# Patient Record
Sex: FEMALE | Race: WHITE | NOT HISPANIC OR LATINO | Employment: FULL TIME | URBAN - METROPOLITAN AREA
[De-identification: names, ages, dates, MRNs, and addresses within clinical notes are randomized per-mention and may not be internally consistent; named-entity substitution may affect disease eponyms.]

---

## 2017-02-27 ENCOUNTER — HISTORICAL (OUTPATIENT)
Dept: ADMINISTRATIVE | Facility: HOSPITAL | Age: 47
End: 2017-02-27

## 2017-12-18 ENCOUNTER — HISTORICAL (OUTPATIENT)
Dept: RADIOLOGY | Facility: HOSPITAL | Age: 47
End: 2017-12-18

## 2019-01-14 ENCOUNTER — HISTORICAL (OUTPATIENT)
Dept: RADIOLOGY | Facility: HOSPITAL | Age: 49
End: 2019-01-14

## 2019-06-03 ENCOUNTER — HISTORICAL (OUTPATIENT)
Dept: ADMINISTRATIVE | Facility: HOSPITAL | Age: 49
End: 2019-06-03

## 2019-06-03 LAB
ALBUMIN SERPL-MCNC: 4.3 GM/DL (ref 3.4–5)
ALBUMIN/GLOB SERPL: 1.65 {RATIO} (ref 1.5–2.5)
ALP SERPL-CCNC: 44 UNIT/L (ref 38–126)
ALT SERPL-CCNC: 16 UNIT/L (ref 7–52)
AST SERPL-CCNC: 22 UNIT/L (ref 15–37)
BILIRUB SERPL-MCNC: 0.4 MG/DL (ref 0.2–1)
BILIRUBIN DIRECT+TOT PNL SERPL-MCNC: 0.1 MG/DL (ref 0–0.5)
BILIRUBIN DIRECT+TOT PNL SERPL-MCNC: 0.3 MG/DL
BUN SERPL-MCNC: 11 MG/DL (ref 7–18)
CALCIUM SERPL-MCNC: 9 MG/DL (ref 8.5–10)
CHLORIDE SERPL-SCNC: 103 MMOL/L (ref 98–107)
CO2 SERPL-SCNC: 28 MMOL/L (ref 21–32)
CREAT SERPL-MCNC: 0.78 MG/DL (ref 0.6–1.3)
GLOBULIN SER-MCNC: 2.6 GM/DL (ref 1.2–3)
GLUCOSE SERPL-MCNC: 106 MG/DL (ref 74–106)
POTASSIUM SERPL-SCNC: 3.7 MMOL/L (ref 3.5–5.1)
PROT SERPL-MCNC: 6.9 GM/DL (ref 6.4–8.2)
SODIUM SERPL-SCNC: 137 MMOL/L (ref 136–145)

## 2019-06-04 ENCOUNTER — HISTORICAL (OUTPATIENT)
Dept: LAB | Facility: HOSPITAL | Age: 49
End: 2019-06-04

## 2020-01-17 ENCOUNTER — HISTORICAL (OUTPATIENT)
Dept: RADIOLOGY | Facility: HOSPITAL | Age: 50
End: 2020-01-17

## 2020-11-30 ENCOUNTER — HISTORICAL (OUTPATIENT)
Dept: ADMINISTRATIVE | Facility: HOSPITAL | Age: 50
End: 2020-11-30

## 2020-11-30 LAB
ABS NEUT (OLG): 2.5 X10(3)/MCL (ref 2.1–9.2)
ERYTHROCYTE [DISTWIDTH] IN BLOOD BY AUTOMATED COUNT: 11.8 % (ref 11.5–17)
HCT VFR BLD AUTO: 37.2 % (ref 37–47)
HGB BLD-MCNC: 12.3 GM/DL (ref 12–16)
LYMPHOCYTES # BLD AUTO: 2.1 X10(3)/MCL (ref 0.6–3.4)
LYMPHOCYTES NFR BLD AUTO: 41.8 % (ref 13–40)
MCH RBC QN AUTO: 29.7 PG (ref 27–31.2)
MCHC RBC AUTO-ENTMCNC: 33 GM/DL (ref 32–36)
MCV RBC AUTO: 90 FL (ref 80–94)
MONOCYTES # BLD AUTO: 0.4 X10(3)/MCL (ref 0.1–1.3)
MONOCYTES NFR BLD AUTO: 7.7 % (ref 0.1–24)
NEUTROPHILS NFR BLD AUTO: 50.5 % (ref 47–80)
PLATELET # BLD AUTO: 264 X10(3)/MCL (ref 130–400)
PMV BLD AUTO: 8.8 FL (ref 9.4–12.4)
RBC # BLD AUTO: 4.14 X10(6)/MCL (ref 4.2–5.4)
WBC # SPEC AUTO: 5 X10(3)/MCL (ref 4.5–11.5)

## 2021-01-18 ENCOUNTER — HISTORICAL (OUTPATIENT)
Dept: RADIOLOGY | Facility: HOSPITAL | Age: 51
End: 2021-01-18

## 2021-06-04 ENCOUNTER — HISTORICAL (OUTPATIENT)
Dept: ADMINISTRATIVE | Facility: HOSPITAL | Age: 51
End: 2021-06-04

## 2021-06-04 LAB
ABS NEUT (OLG): 3.9 X10(3)/MCL (ref 2.1–9.2)
ALBUMIN SERPL-MCNC: 4.6 GM/DL (ref 3.4–5)
ALBUMIN/GLOB SERPL: 1.64 {RATIO} (ref 1.5–2.5)
ALP SERPL-CCNC: 58 UNIT/L (ref 38–126)
ALT SERPL-CCNC: 16 UNIT/L (ref 7–52)
APPEARANCE, UA: CLEAR
AST SERPL-CCNC: 22 UNIT/L (ref 15–37)
BACTERIA #/AREA URNS AUTO: ABNORMAL /HPF
BILIRUB SERPL-MCNC: 0.4 MG/DL (ref 0.2–1)
BILIRUB UR QL STRIP: NEGATIVE MG/DL
BILIRUBIN DIRECT+TOT PNL SERPL-MCNC: 0.1 MG/DL (ref 0–0.5)
BILIRUBIN DIRECT+TOT PNL SERPL-MCNC: 0.3 MG/DL
BUN SERPL-MCNC: 10 MG/DL (ref 7–18)
CALCIUM SERPL-MCNC: 9.7 MG/DL (ref 8.5–10)
CHLORIDE SERPL-SCNC: 100 MMOL/L (ref 98–107)
CHOLEST SERPL-MCNC: 228 MG/DL (ref 0–200)
CHOLEST/HDLC SERPL: 2.9 {RATIO}
CO2 SERPL-SCNC: 31 MMOL/L (ref 21–32)
COLOR UR: YELLOW
CREAT SERPL-MCNC: 0.73 MG/DL (ref 0.6–1.3)
ERYTHROCYTE [DISTWIDTH] IN BLOOD BY AUTOMATED COUNT: 12 % (ref 11.5–17)
EST CREAT CLEARANCE SER (OHS): 78.01 ML/MIN
GLOBULIN SER-MCNC: 2.8 GM/DL (ref 1.2–3)
GLUCOSE (UA): NEGATIVE MG/DL
GLUCOSE SERPL-MCNC: 93 MG/DL (ref 74–106)
HCT VFR BLD AUTO: 37.6 % (ref 37–47)
HDLC SERPL-MCNC: 79 MG/DL (ref 35–60)
HGB BLD-MCNC: 12.7 GM/DL (ref 12–16)
HGB UR QL STRIP: NEGATIVE UNIT/L
KETONES UR QL STRIP: NEGATIVE MG/DL
LDLC SERPL CALC-MCNC: 118 MG/DL (ref 0–129)
LEUKOCYTE ESTERASE UR QL STRIP: ABNORMAL UNIT/L
LYMPHOCYTES # BLD AUTO: 1.8 X10(3)/MCL (ref 0.6–3.4)
LYMPHOCYTES NFR BLD AUTO: 27.7 % (ref 13–40)
MCH RBC QN AUTO: 30.6 PG (ref 27–31.2)
MCHC RBC AUTO-ENTMCNC: 34 GM/DL (ref 32–36)
MCV RBC AUTO: 91 FL (ref 80–94)
MONOCYTES # BLD AUTO: 0.7 X10(3)/MCL (ref 0.1–1.3)
MONOCYTES NFR BLD AUTO: 11.7 % (ref 0.1–24)
NEUTROPHILS NFR BLD AUTO: 60.6 % (ref 47–80)
NITRITE UR QL STRIP.AUTO: NEGATIVE
PH UR STRIP: 7 [PH]
PLATELET # BLD AUTO: 260 X10(3)/MCL (ref 130–400)
PMV BLD AUTO: 8.6 FL (ref 9.4–12.4)
POTASSIUM SERPL-SCNC: 4.3 MMOL/L (ref 3.5–5.1)
PROT SERPL-MCNC: 7.4 GM/DL (ref 6.4–8.2)
PROT UR QL STRIP: NEGATIVE MG/DL
RBC # BLD AUTO: 4.15 X10(6)/MCL (ref 4.2–5.4)
RBC #/AREA URNS HPF: ABNORMAL /HPF
SODIUM SERPL-SCNC: 137 MMOL/L (ref 136–145)
SP GR UR STRIP: 1.01
SQUAMOUS EPITHELIAL, UA: ABNORMAL /LPF
TRIGL SERPL-MCNC: 139 MG/DL (ref 30–150)
TSH SERPL-ACNC: 0.99 MIU/ML (ref 0.35–4.94)
UROBILINOGEN UR STRIP-ACNC: 0.2 MG/DL
VLDLC SERPL CALC-MCNC: 27.8 MG/DL
WBC # SPEC AUTO: 6.4 X10(3)/MCL (ref 4.5–11.5)
WBC #/AREA URNS AUTO: ABNORMAL /[HPF]

## 2022-01-21 ENCOUNTER — HISTORICAL (OUTPATIENT)
Dept: RADIOLOGY | Facility: HOSPITAL | Age: 52
End: 2022-01-21

## 2022-04-07 ENCOUNTER — HISTORICAL (OUTPATIENT)
Dept: ADMINISTRATIVE | Facility: HOSPITAL | Age: 52
End: 2022-04-07

## 2022-04-24 VITALS
SYSTOLIC BLOOD PRESSURE: 110 MMHG | BODY MASS INDEX: 18.69 KG/M2 | HEIGHT: 67 IN | WEIGHT: 119.06 LBS | DIASTOLIC BLOOD PRESSURE: 80 MMHG

## 2022-05-01 NOTE — HISTORICAL [facility]
This is a historical note converted from Mora. Formatting and pictures may have been removed.  Please reference Mora for original formatting and attached multimedia. Chief Complaint  6 month recheck  History of Present Illness  Patient here for six-month follow-up. Denies any side effects to current medications.? Tolerating current meds.? Denies change in social or family history.  ?  Anxiety/Depression: She has completely weaned herself off of clonazepam.? Since then, she finds?her anxiety is increased?in the afternoons.? She?is having mild panic attacks almost daily. ?She denies any suicidal/harmful ideations.??She is currently taking BuSpar 15 mg 1-1/2?tabs?twice a day.  ?  Anemia: She takes an iron supplement daily. No fatigue.  ?  Vaccinations:  Flu -?received 8/2020  Review of Systems  General:?? Patient reports energy level is ?good. Denies weight change.??Denies fever,chills, night sweats, or weakness. ?Denies fatigue.  HEENT:?? Denies vision changes or eye pain.??No sore throat, ear pain, sinus pressure or discharge.  Cardiovascular:?? Denies chest pain, palpitations, dyspnea on exertion, orthopnea.  Respiratory:?? No cough, wheezing, shortness of breath, or sputum.  GI:?? Denies nausea, emesis, constipation, diarrhea, melena, hematochezia or abdominal pain  :?? No frequency, urgency, hematuria, discharge, or incontinence  MS:?? Denies myalgias, arthralgias, joint effusion, edema, or weakness  Neuro:?? No headaches, numbness in extremities, tingling, dizziness, or weakness  Psych:?? See HPI  Endo:?? Denies polyuria, polydipsia, polyphagia  Heme:?? No abnormal bleeding or bruising. No lymph node enlargement or pain.  ?  Physical Exam  Vitals & Measurements  HR:?76(Peripheral)? BP:?122/70?  HT:?169?cm? HT:?169.00?cm? WT:?54?kg? WT:?54.000?kg? BMI:?18.91?  General:?? Well-developed and??nourished, no apparent distress, alert and oriented??4.  HEENT:?? PERRLA, EOMI  Neck:?? Supple, no lymphadenopathy, no  thyromegaly, no bruits, no jugular venous distention.  Cardiovascular:?? Regular rhythm and rate, no murmurs, radial and dorsal pedal pulses 2+ bilaterally.  Respiratory:?? Lungs clear to auscultation bilaterally, no wheezes, no crackles, no rhonchi.??Good air movement.  Abdomen:?? NABS, soft, nontender, no hepatosplenomegaly, no masses, no guarding or rebound.?  Neuro:?? CN II-XII intact_  Psych: Normal affect, patient calm, good historian, patient answers questions?appropriately. Good hygiene.  Heme:? No bruising or petechiae.  ?  Assessment/Plan  1.?Anxiety?F41.9  Increase Buspar to 15mg 1.5 tabs TID PRN anxiety. ?Patient is aware?this is the max dosage of Buspar.  She wishes to?continue to avoid?clonazepam.  Continue sertraline 100 mg twice daily and Wellbutrin XL?150?mg daily.  Ordered:  sertraline, 100 mg = 1 tab(s), Oral, BID, # 180 tab(s), 1 Refill(s), Pharmacy: [facility] #[id number], 169, cm, Height/Length Dosing, 11/30/20 9:20:00 CST, 54, kg, Weight Dosing, 11/30/20 9:20:00 CST  Clinic Follow up, *Est. 05/30/21 3:00:00 CDT, CPX in 6 months, CPX in 6 months, Order for future visit, Anxiety  Depression  Anemia, [facility]  Office/Outpatient Visit Level 3 Established 38849 , Anxiety  Depression  Anemia, [facility] - [facility], 11/30/20 9:50:00 CST  ?  2.?Depression?F32.9  Controlled.  Ordered:  sertraline, 100 mg = 1 tab(s), Oral, BID, # 180 tab(s), 1 Refill(s), Pharmacy: [facility]/pharmacy #[id number], 169, cm, Height/Length Dosing, 11/30/20 9:20:00 CST, 54, kg, Weight Dosing, 11/30/20 9:20:00 CST  Clinic Follow up, *Est. 05/30/21 3:00:00 CDT, CPX in 6 months, CPX in 6 months, Order for future visit, Anxiety  Depression  Anemia, [facility]  Office/Outpatient Visit Level 3 Established 40445 PC, Anxiety  Depression  Anemia, [facility] - [facility], 11/30/20 9:50:00 CST  ?  3.?Anemia?D64.9  ?Lab today: CBC  Ordered:  CBC w/ Auto Diff, Routine collect, 11/30/20 9:49:00 CST, Blood, Order for future visit, Stop date 11/30/20 9:49:00  CST, Lab Collect, Anemia, 11/30/20 9:49:00 CST  Clinic Follow up, *Est. 05/30/21 3:00:00 CDT, CPX in 6 months, CPX in 6 months, Order for future visit, Anxiety  Depression  Anemia, [facility]  Lab Collection Request, 11/30/20 9:49:00 CST, [facility] - [facility], 11/30/20 9:49:00 CST, Anemia  Office/Outpatient Visit Level 3 Established 76868 , Anxiety  Depression  Anemia, [facility] - [facility], 11/30/20 9:50:00 CST  ?  Orders:  buPROPion, 150 mg = 1 tab(s), Oral, Daily, # 90 tab(s), 1 Refill(s), Pharmacy: [facility]/pharmacy #[id number], 169, cm, Height/Length Dosing, 11/30/20 9:20:00 CST, 54, kg, Weight Dosing, 11/30/20 9:20:00 CST  busPIRone, 22.5 mg = 1.5 tab(s), Oral, TID, PRN PRN anxiety, # 405 tab(s), 1 Refill(s), Pharmacy: [facility]/pharmacy #[id number], 169, cm, Height/Length Dosing, 11/30/20 9:20:00 CST, 54, kg, Weight Dosing, 11/30/20 9:20:00 CST  Referrals  Clinic Follow up, *Est. 05/30/21 3:00:00 CDT, CPX in 6 months, CPX in 6 months, Order for future visit, Anxiety  Depression  Anemia, [facility]   Problem List/Past Medical History  Ongoing  Anemia  Anxiety  Depression  Macrocytosis  Migraine  Wellness examination  Historical  cerebral aneurym  Stroke  Procedure/Surgical History  Colonoscopy (06/20/2019)  Laparoscopy, surgical; with fulguration of oviducts (with or without transection). (06/07/2013)  Other bilateral endoscopic destruction or occlusion of fallopian tubes (06/07/2013)  cerebral shunt  repair of cerebral aneurysm   Medications  buPROPion 150 mg/24 hours (XL) oral tablet, extended release, 150 mg= 1 tab(s), Oral, Daily, 1 refills  busPIRone 15 mg oral tablet, 22.5 mg= 1.5 tab(s), Oral, TID, PRN, 1 refills  sertraline 100 mg oral tablet, 100 mg= 1 tab(s), Oral, BID, 1 refills  Allergies  Demerol?(tremors)  Dilantin?(rash)  Social History  Abuse/Neglect  No, 11/30/2020  Alcohol - Denies Alcohol Use, 06/06/2013  Past, 02/11/2019  Home/Environment  Lives with Spouse., 06/03/2019  Substance Use - Denies Substance Abuse,  06/06/2013  Never, 02/11/2019  Tobacco - Denies Tobacco Use, 06/06/2013  Never (less than 100 in lifetime), No, 11/30/2020  Family History  Alcoholism.: Father.  Depression.: Father.  Immunizations  Vaccine Date Status   influenza virus vaccine, inactivated 08/24/2020 Given   zoster vaccine, inactivated 08/24/2020 Given   tetanus/diphtheria/pertussis, acel(Tdap) 07/10/2020 Given   zoster vaccine, inactivated 05/27/2020 Given   tetanus/diphtheria/pertussis, acel(Tdap) 05/27/2020 Given   influenza virus vaccine, inactivated 10/05/2018 Recorded   Health Maintenance  Health Maintenance  ???Pending?(in the next year)  ??? ??OverDue  ??? ? ? ?Alcohol Misuse Screening due??01/02/20??and every 1??year(s)  ??? ??Due?  ??? ? ? ?Influenza Vaccine due??10/01/20??and every 1??day(s)  ??? ? ? ?ADL Screening due??11/30/20??and every 1??year(s)  ??? ??Due In Future?  ??? ? ? ?Obesity Screening not due until??01/01/21??and every 1??year(s)  ???Satisfied?(in the past 1 year)  ??? ??Satisfied?  ??? ? ? ?Blood Pressure Screening on??11/30/20.??Satisfied by Ginny Salazar LPN  ??? ? ? ?Body Mass Index Check on??11/30/20.??Satisfied by Ginny Salazar LPN  ??? ? ? ?Breast Cancer Screening on??01/17/20.??Satisfied by Darion Lund  ??? ? ? ?Cervical Cancer Screening on??10/15/20.??Satisfied by Kaitlin Martines  ??? ? ? ?Diabetes Screening on??05/27/20.??Satisfied by Mike Odom  ??? ? ? ?Influenza Vaccine on??08/24/20.??Satisfied by Rachel Ma CMA  ??? ? ? ?Lipid Screening on??05/27/20.??Satisfied by Mike Odom  ??? ? ? ?Obesity Screening on??11/30/20.??Satisfied by Ginny Salazar LPN  ??? ? ? ?Tetanus Vaccine on??07/10/20.??Satisfied by Kat Walls  ?      Patient condition discussed?in detail with nurse practitioner.??Agree with plan of care?and follow-up.  ?

## 2022-05-01 NOTE — HISTORICAL [facility] C[facility]ER
This is a historical note converted from Mora. Formatting and pictures may have been removed.  Please reference Mora for original formatting and attached multimedia. Chief Complaint  cpx  History of Present Illness  Patient presents for her wellness exam.  Reports that her medications are working well without side effects?and her mood is?well controlled.??Denies change in?social or family history.  ?   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 ETOH: none, ?Tob: none,? Exercise: walks 5 miles a day,  Going on a month long road trip this summer.  ?   Father  at?54: Pancreatic CA, alcoholism  Mother 72:?multiple myeloma in remission, diagnosed at 71yo  Brother 49: healthy except injuries from MVA  PGM Pancreatic CA, PAunt with Lung CA  ?   Surgical Hx:  Surgery to repair Cerebral Aneurysm at 15 yrs old. Occurred on May 16, stayed 16 days in hospital. Cerebral shunt placed.  Tubal ligation   ?   GI: Person,? Normal colonoscopy 18  GYN: Dr. Ogden, menopausal  ?????Last MMG: UTD  ?????Last DEXA: no lifetime  ?????Last PAP: UTD  Review of Systems  General:??????????????? Patient reports energy level is good.???Denies fever,chills, night sweats, fatigue or weakness.  Integument:???????? Denies any nevus changes, rashes, urticaria, ?or sores.? Also denies itching or areas of numbness.  HEENT:?????????????????? Denies vision changes or eye pain.? No sore throat, ear pain, sinus pressure or discharge.  Cardiovascular:??? Denies chest pain, palpitations, dyspnea on exertion, orthopnea.  Respiratory:???????? No cough, wheezing, shortness of breath, or sputum.  GI:????????????????????????? Denies nausea, emesis, constipation, diarrhea, melena, hematochezia or abdominal pain  :??????????????????????? No frequency, urgency, hematuria, discharge, or incontinence  MS:?????????????????????? Denies myalgias, arthralgias, joint effusion, edema, or  weakness  Neuro:????????????????? No headaches, numbness in extremities, tingling, dizziness, or weakness  Psych:?????????????????? Denies anxiety, depression, suicidal or homicidal ideations, or irritability  Endo:??????????????????? Denies polyuria, polydipsia, polyphagia  Heme:????????????????? No abnormal bleeding or bruising. No lymph node enlargement or pain.  Physical Exam  Vitals & Measurements  HR:?73(Peripheral)? BP:?115/70?  HT:?169.00?cm? HT:?169?cm? WT:?54.200?kg? WT:?54.2?kg? BMI:?18.98?  General :?????Well-developed and? nourished, no apparent distress, alert and oriented ?4  Integument :????? Skin is intact with no erythema.? No pustules or vesicles.? No rash or scale. No Lymphadenopathy.? No urticaria.? No abnormal nevi  HEENT :?????YOEL, EOMI ; TMs and EACs clear, normal turbinates with no erythema, normal mucosa, no sinus tenderness; no erythema or exudate of mouth or pharynx  Neck :?????Supple, no lymphadenopathy, no thyromegaly, no bruits, no jugular venous distention  Cardiovascular :?????? Regular rhythm and rate, no murmurs, radial and dorsal pedal pulses 2+ bilaterally  Respiratory :??????Lungs clear to auscultation bilaterally, no wheezes, no crackles, no rhonchi.? Good air movement  Abdomen :?????? ?NABS, soft, nontender, no hepatosplenomegaly, no masses, no guarding or rebound??????????????????????????  MS :??????? No muscle tenderness, joints WNL, FROM, negative SLR, no?CCE  Neuro :? ?????? CN II-XII intact, reflexes 2+ throughout, no motor sensory deficits, negative?cerebellar? tests  Psych :??????Normal affect, patient calm, good historian, patient answers questions??? appropriately.???????  Heme :?????? No bruising or petechiae?  Assessment/Plan  1.?Wellness examination?Z00.00  ?Age-appropriate wellness topics discussed.? We will draw fasting labs today and contact her with results.  Ordered:  CBC w/ Auto Diff, Routine collect, 06/04/21 9:14:00 CDT, Blood, Order for future visit,  Stop date 06/04/21 9:14:00 CDT, Lab Collect, Wellness examination, 06/04/21 9:14:00 CDT  Comprehensive Metabolic Panel, Routine collect, 06/04/21 9:14:00 CDT, Blood, Order for future visit, Stop date 06/04/21 9:14:00 CDT, Lab Collect, Wellness examination, 06/04/21 9:14:00 CDT  Lab Collection Request, 06/04/21 9:14:00 CDT, [facility] - [facility], 06/04/21 9:14:00 CDT, Wellness examination  Lipid Panel, Routine collect, 06/04/21 9:14:00 CDT, Blood, Order for future visit, Stop date 06/04/21 9:14:00 CDT, Lab Collect, Wellness examination, 06/04/21 9:14:00 CDT  Preventative Health Care Est 40-64 years 93547 PC, Wellness examination  Depression  Anxiety, [facility] - [facility], 06/04/21 9:16:00 CDT  Thyroid Stimulating Hormone, Routine collect, 06/04/21 9:15:00 CDT, Blood, Order for future visit, Stop date 06/04/21 9:15:00 CDT, Lab Collect, Wellness examination  Anxiety  Depression, 06/04/21 9:15:00 CDT  Urinalysis no Reflex, Routine collect, Urine, Order for future visit, 06/04/21 9:14:00 CDT, Stop date 06/04/21 9:14:00 CDT, Nurse collect, Wellness examination  ?  2.?Depression?F32.9  ?Mood is well controlled. ?Refill?current medications for 6 months.  Ordered:  Clinic Follow up, *Est. 12/04/21 3:00:00 CST, Order for future visit, Depression  Anxiety, [facility]  Preventative Health Care Est 40-64 years 60719 PC, Wellness examination  Depression  Anxiety, [facility] AM[facility] - [facility], 06/04/21 9:16:00 CDT  Thyroid Stimulating Hormone, Routine collect, 06/04/21 9:15:00 CDT, Blood, Order for future visit, Stop date 06/04/21 9:15:00 CDT, Lab Collect, Wellness examination  Anxiety  Depression, 06/04/21 9:15:00 CDT  ?  3.?Anxiety?F41.9  ?Mood is well controlled.  Ordered:  Clinic Follow up, *Est. 12/04/21 3:00:00 CST, Order for future visit, Depression  Anxiety, [facility]  Preventative Health Care Est 40-64 years 17051 PC, Wellness examination  Depression  Anxiety, [facility] AMB - [facility], 06/04/21 9:16:00 CDT  Thyroid Stimulating Hormone, Routine  collect, 06/04/21 9:15:00 CDT, Blood, Order for future visit, Stop date 06/04/21 9:15:00 CDT, Lab Collect, Wellness examination  Anxiety  Depression, 06/04/21 9:15:00 CDT  ?  Referrals  Clinic Follow up, *Est. 12/04/21 3:00:00 CST, Order for future visit, Depression  Anxiety, [facility]   Problem List/Past Medical History  Ongoing  Anemia  Anxiety  Depression  Macrocytosis  Migraine  Wellness examination  Historical  cerebral aneurym  Stroke  Procedure/Surgical History  Colonoscopy (06/20/2019)  Laparoscopy, surgical; with fulguration of oviducts (with or without transection). (06/07/2013)  Other bilateral endoscopic destruction or occlusion of fallopian tubes (06/07/2013)  cerebral shunt  repair of cerebral aneurysm   Medications  buPROPion 150 mg/24 hours (XL) oral tablet, extended release, 150 mg= 1 tab(s), Oral, Daily, 1 refills  busPIRone 15 mg oral tablet, See Instructions, 1 refills  sertraline 100 mg oral tablet, 100 mg= 1 tab(s), Oral, BID, 1 refills  Allergies  Demerol?(tremors)  Dilantin?(rash)  Social History  Abuse/Neglect  No, 06/04/2021  No, 11/30/2020  Alcohol - Denies Alcohol Use, 06/06/2013  Past, 02/11/2019  Home/Environment  Lives with Spouse., 06/03/2019  Substance Use - Denies Substance Abuse, 06/06/2013  Never, 02/11/2019  Tobacco - Denies Tobacco Use, 06/06/2013  Never (less than 100 in lifetime), No, 06/04/2021  Never (less than 100 in lifetime), No, 11/30/2020  Family History  Alcoholism.: Father.  Depression.: Father.  Immunizations  Vaccine Date Status   influenza virus vaccine, inactivated 08/24/2020 Given   zoster vaccine, inactivated 08/24/2020 Given   tetanus/diphtheria/pertussis, acel(Tdap) 07/10/2020 Given   zoster vaccine, inactivated 05/27/2020 Given   tetanus/diphtheria/pertussis, acel(Tdap) 05/27/2020 Given   influenza virus vaccine, inactivated 10/05/2018 Recorded   Health Maintenance  Health Maintenance  ???Pending?(in the next year)  ??? ??OverDue  ??? ? ? ?Influenza  Vaccine due??10/01/20??and every 1??day(s)  ??? ? ? ?Alcohol Misuse Screening due??01/02/21??and every 1??year(s)  ??? ??Due?  ??? ? ? ?ADL Screening due??06/04/21??and every 1??year(s)  ??? ??Due In Future?  ??? ? ? ?Obesity Screening not due until??01/01/22??and every 1??year(s)  ???Satisfied?(in the past 1 year)  ??? ??Satisfied?  ??? ? ? ?Blood Pressure Screening on??06/04/21.??Satisfied by Dong Cordero  ??? ? ? ?Body Mass Index Check on??06/04/21.??Satisfied by Dong Cordero  ??? ? ? ?Breast Cancer Screening on??01/18/21.??Satisfied by Darion Lund  ??? ? ? ?Cervical Cancer Screening on??10/15/20.??Satisfied by Kaitlin Martines  ??? ? ? ?Influenza Vaccine on??08/24/20.??Satisfied by Rachel Ma CMA  ??? ? ? ?Obesity Screening on??06/04/21.??Satisfied by Dong Cordero  ??? ? ? ?Tetanus Vaccine on??07/10/20.??Satisfied by Kat Walls  ?

## 2022-05-01 NOTE — HISTORICAL [facility]
This is a historical note converted from [employer]. Formatting and pictures may have been removed.  Please reference [employer] for original formatting and attached multimedia. Chief Complaint  C/O DIARRHEA YESTERDAY. TODAY 1 SMALL LOOSE STOOL  History of Present Illness  Diarrhea started in March. Stopped dairy and completed the cipro at the same time and diarrhea resolved. Reintroduced dairy in April. No diarrhea since until?6 days ago. Stools are?watery to loose. No melena/hematochezia. No fever or abdominal pain. No recent travel except to [place]. Completed very stressful job at the same time the diarrhea started. Mild nausea. No vomiting.?Stools every 2-3 hours.?Bowel movement frequency has slowed down today. Did take dose of Metamucil this morning.  Review of Systems  General:?? Denies weight change.??Denies fever,chills, night sweats. Reports occasional weakness since diarrhea started.  Cardiovascular:?? Denies chest pain, palpitations, dyspnea on exertion, orthopnea.  Respiratory:?? No cough, wheezing, shortness of breath, or sputum.  GI:?? Denies?emesis, constipation,?melena, hematochezia or abdominal pain  :?? No frequency, urgency, hematuria, discharge, or incontinence  Psych:?? Denies anxiety, depression, suicidal or homicidal ideations, or irritability  Endo:?? Denies polyuria, polydipsia, polyphagia  ?  Physical Exam  Vitals & Measurements  T:?36.7? ?C (Oral)? HR:?94(Peripheral)? BP:?110/70?  HT:?169?cm? WT:?54.1?kg? BMI:?18.94?  General:?? Well-developed and??nourished, no apparent distress, alert and oriented??4.  Cardiovascular:?? Regular rhythm and rate, no murmurs, radial and dorsal pedal pulses 2+ bilaterally.  Respiratory:?? Lungs clear to auscultation bilaterally, no wheezes, no crackles, no rhonchi.??Good air movement.  Abdomen:?? NABS, soft, nontender, no hepatosplenomegaly, no masses, no guarding or rebound.?  MS:?? No muscle tenderness, joints WN L, FROM, negative SLR, no?CCE.  Neuro:?? CN  II-XII intact, reflexes 2+ throughout, no motor sensory deficits, negative cerebellar??tests.  Psych: Normal affect, patient calm, good historian, patient answers questions?appropriately. Good hygiene.  Heme:? No bruising or petechiae.  ?  Assessment/Plan  1.?Diarrhea?R19.7  Labs today: C Diff, Stool culture, CMP  Cipro sent to patients pharmacy - Since stools have slowed down today, patient will hold off on starting Cipro. She will start if resumes within this week.  Will consider referral to GI post test results.  Increase fiber intake. Maintain hydration.  Ordered:  Clostridium Difficile Toxin Assay Stool, Routine collect, 06/03/19 15:36:00 CDT, Stool Clostrium difficile, Order for future visit, Stop date 06/03/19 15:36:00 CDT, Nurse collect, Diarrhea, 06/03/19 15:36:00 CDT  Comprehensive Metabolic Panel, Routine collect, 06/03/19 15:57:00 CDT, Blood, Order for future visit, Stop date 06/03/19 15:57:00 CDT, Lab Collect, Diarrhea, 06/03/19 15:57:00 CDT  Office/Outpatient Visit Level 3 Established 27670 PC, Diarrhea, [facility] - AFP, 06/03/19 15:58:00 CDT  Stool Culture, Routine collect, 06/03/19 15:38:00 CDT, Order for future visit, Stool, Stop date 06/03/19 15:38:00 CDT, Diarrhea  ?  Orders:  Clinic Follow-up PRN, 06/03/19 15:58:00 CDT, [facility] - [facility], Future Order  Referrals  Clinic Follow-up PRN, 06/03/19 15:58:00 CDT, [facility] - AFP, Future Order   Problem List/Past Medical History  Ongoing  Anxiety  Depression  Macrocytosis  Migraine  Wellness examination  Historical  cerebral aneurym  Stroke  Procedure/Surgical History  Laparoscopy, surgical; with fulguration of oviducts (with or without transection). (06/07/2013)  Other bilateral endoscopic destruction or occlusion of fallopian tubes (06/07/2013)  cerebral shunt  repair of cerebral aneurysm   Medications  buPROPion 150 mg oral tablet, extended release, 150 mg= 1 tab(s), Oral, Daily, 2 refills  busPIRone 15 mg oral tablet, 15 mg= 1 tab(s), Oral, QID, 2  refills  Cipro 500 mg oral tablet, 500 mg= 1 tab(s), Oral, q12hr  clonazePAM 0.5 mg oral tablet, 0.5 mg= 1 tab(s), Oral, BID, 2 refills  sertraline 100 mg oral tablet, 100 mg= 1 tab(s), Oral, BID, 2 refills  Allergies  Demerol?(tremors)  Dilantin?(rash)  Social History  Alcohol - Denies Alcohol Use, 06/06/2013  Past, 02/11/2019  Home/Environment  Lives with Spouse., 06/03/2019  Substance Abuse - Denies Substance Abuse, 06/06/2013  Never, 02/11/2019  Tobacco - Denies Tobacco Use, 06/06/2013  Never (less than 100 in lifetime), N/A, 06/03/2019  Never (less than 100 in lifetime), N/A, 03/08/2019  Family History  Alcoholism.: Father.  Depression.: Father.  Immunizations  Vaccine Date Status   influenza virus vaccine, inactivated 10/05/2018 Recorded   Health Maintenance  Health Maintenance  ???Pending?(in the next year)  ??? ??OverDue  ??? ? ? ?Diabetes Screening due??and every?  ??? ? ? ?Alcohol Misuse Screening due??01/01/19??and every 1??year(s)  ??? ??Due?  ??? ? ? ?ADL Screening due??06/03/19??and every 1??year(s)  ??? ? ? ?Tetanus Vaccine due??06/03/19??and every 10??year(s)  ??? ??Due In Future?  ??? ? ? ?Obesity Screening not due until??01/01/20??and every 1??year(s)  ??? ? ? ?Blood Pressure Screening not due until??06/02/20??and every 1??year(s)  ??? ? ? ?Body Mass Index Check not due until??06/02/20??and every 1??year(s)  ???Satisfied?(in the past 1 year)  ??? ??Satisfied?  ??? ? ? ?Blood Pressure Screening on??06/03/19.??Satisfied by Marquis OBRIEN, Kera HAYS  ??? ? ? ?Body Mass Index Check on??06/03/19.??Satisfied by Molly To LPN  ??? ? ? ?Breast Cancer Screening on??01/14/19.??Satisfied by Darion Lund  ??? ? ? ?Cervical Cancer Screening on??10/05/18.??Satisfied by Ksenia Hamilton  ??? ? ? ?Diabetes Screening on??02/11/19.??Satisfied by Mike Odom  ??? ? ? ?Influenza Vaccine on??03/08/19.??Satisfied by Marlyn Grant LPN  ??? ? ? ?Lipid Screening on??02/11/19.??Satisfied by Mike Odom  ??? ? ?  ?Obesity Screening on??06/03/19.??Satisfied by Molly To LPN  ?  ?      Patient condition discussed?in detail with nurse practitioner.??Agree with plan of care?and follow-up.  ?

## 2022-06-20 PROBLEM — D64.9 ANEMIA: Status: ACTIVE | Noted: 2022-06-20

## 2022-06-20 PROBLEM — G43.909 MIGRAINE HEADACHE: Status: ACTIVE | Noted: 2022-06-20

## 2022-06-20 PROBLEM — D75.89 MACROCYTOSIS: Status: ACTIVE | Noted: 2022-06-20

## 2022-06-20 PROBLEM — F32.A DEPRESSIVE DISORDER: Status: ACTIVE | Noted: 2022-06-20

## 2022-06-20 PROBLEM — F41.9 ANXIETY: Status: ACTIVE | Noted: 2022-06-20

## 2023-01-23 ENCOUNTER — HOSPITAL ENCOUNTER (OUTPATIENT)
Dept: RADIOLOGY | Facility: HOSPITAL | Age: 53
Discharge: HOME | End: 2023-01-23
Attending: OBSTETRICS & GYNECOLOGY
Payer: COMMERCIAL

## 2023-01-23 DIAGNOSIS — Z12.31 ENCOUNTER FOR SCREENING MAMMOGRAM FOR BREAST CANCER: ICD-10-CM

## 2023-01-23 PROCEDURE — 77063 BREAST TOMOSYNTHESIS BI: CPT | Mod: 26,,, | Performed by: RADIOLOGY

## 2023-01-23 PROCEDURE — 77067 SCR MAMMO BI INCL CAD: CPT | Mod: 26,,, | Performed by: RADIOLOGY

## 2023-01-23 PROCEDURE — 77063 MAMMO DIGITAL SCREENING BILAT WITH TOMO: ICD-10-PCS | Mod: 26,,, | Performed by: RADIOLOGY

## 2023-01-23 PROCEDURE — 77067 SCR MAMMO BI INCL CAD: CPT | Mod: TC

## 2023-01-23 PROCEDURE — 77067 MAMMO DIGITAL SCREENING BILAT WITH TOMO: ICD-10-PCS | Mod: 26,,, | Performed by: RADIOLOGY

## 2024-01-26 ENCOUNTER — HOSPITAL ENCOUNTER (OUTPATIENT)
Dept: RADIOLOGY | Facility: HOSPITAL | Age: 54
Discharge: HOME | End: 2024-01-26
Attending: OBSTETRICS & GYNECOLOGY
Payer: COMMERCIAL

## 2024-01-26 DIAGNOSIS — Z12.31 ENCOUNTER FOR SCREENING MAMMOGRAM FOR BREAST CANCER: ICD-10-CM

## 2024-01-26 PROCEDURE — 77063 BREAST TOMOSYNTHESIS BI: CPT | Mod: 26,,, | Performed by: STUDENT IN AN ORGANIZED HEALTH CARE EDUCATION/TRAINING PROGRAM

## 2024-01-26 PROCEDURE — 77067 SCR MAMMO BI INCL CAD: CPT | Mod: TC

## 2024-01-26 PROCEDURE — 77067 SCR MAMMO BI INCL CAD: CPT | Mod: 26,,, | Performed by: STUDENT IN AN ORGANIZED HEALTH CARE EDUCATION/TRAINING PROGRAM

## 2024-07-19 PROBLEM — E78.89 ELEVATED HDL: Status: ACTIVE | Noted: 2024-07-19

## 2024-11-11 DIAGNOSIS — Z12.31 OTHER SCREENING MAMMOGRAM: Primary | ICD-10-CM

## 2025-01-27 ENCOUNTER — HOSPITAL ENCOUNTER (OUTPATIENT)
Dept: RADIOLOGY | Facility: HOSPITAL | Age: 55
Discharge: HOME | End: 2025-01-27
Attending: OBSTETRICS & GYNECOLOGY
Payer: COMMERCIAL

## 2025-01-27 DIAGNOSIS — Z12.31 OTHER SCREENING MAMMOGRAM: ICD-10-CM

## 2025-01-27 PROCEDURE — 77063 BREAST TOMOSYNTHESIS BI: CPT | Mod: TC

## 2025-01-27 PROCEDURE — 77063 BREAST TOMOSYNTHESIS BI: CPT | Mod: 26,,, | Performed by: STUDENT IN AN ORGANIZED HEALTH CARE EDUCATION/TRAINING PROGRAM

## 2025-01-27 PROCEDURE — 77067 SCR MAMMO BI INCL CAD: CPT | Mod: 26,,, | Performed by: STUDENT IN AN ORGANIZED HEALTH CARE EDUCATION/TRAINING PROGRAM

## 2025-07-24 ENCOUNTER — LAB VISIT (OUTPATIENT)
Dept: LAB | Facility: HOSPITAL | Age: 55
End: 2025-07-24
Attending: OBSTETRICS & GYNECOLOGY
Payer: COMMERCIAL

## 2025-07-24 DIAGNOSIS — E55.9 AVITAMINOSIS D: ICD-10-CM

## 2025-07-24 DIAGNOSIS — E34.9 ENDOCRINE DISORDER RELATED TO PUBERTY: Primary | ICD-10-CM

## 2025-07-24 LAB
25(OH)D3+25(OH)D2 SERPL-MCNC: 68 NG/ML (ref 30–80)
ESTRADIOL SERPL HS-MCNC: <24 PG/ML
FSH SERPL-ACNC: 87.35 MIU/ML
TESTOST SERPL-MCNC: 17.34 NG/DL (ref 12.4–35.75)
TSH SERPL-ACNC: 1.14 UIU/ML (ref 0.35–4.94)

## 2025-07-24 PROCEDURE — 82670 ASSAY OF TOTAL ESTRADIOL: CPT

## 2025-07-24 PROCEDURE — 84443 ASSAY THYROID STIM HORMONE: CPT

## 2025-07-24 PROCEDURE — 36415 COLL VENOUS BLD VENIPUNCTURE: CPT

## 2025-07-24 PROCEDURE — 84403 ASSAY OF TOTAL TESTOSTERONE: CPT

## 2025-07-24 PROCEDURE — 83001 ASSAY OF GONADOTROPIN (FSH): CPT

## 2025-07-24 PROCEDURE — 82306 VITAMIN D 25 HYDROXY: CPT
